# Patient Record
Sex: MALE | URBAN - METROPOLITAN AREA
[De-identification: names, ages, dates, MRNs, and addresses within clinical notes are randomized per-mention and may not be internally consistent; named-entity substitution may affect disease eponyms.]

---

## 2020-04-02 ENCOUNTER — APPOINTMENT (RX ONLY)
Dept: URBAN - METROPOLITAN AREA CLINIC 151 | Facility: CLINIC | Age: 25
Setting detail: DERMATOLOGY
End: 2020-04-02

## 2020-04-02 DIAGNOSIS — B35.2 TINEA MANUUM: ICD-10-CM

## 2020-04-02 DIAGNOSIS — N48.1 BALANITIS: ICD-10-CM

## 2020-04-02 PROCEDURE — 99202 OFFICE O/P NEW SF 15 MIN: CPT | Mod: 95

## 2020-04-02 PROCEDURE — ? PRESCRIPTION

## 2020-04-02 PROCEDURE — ? DIAGNOSIS COMMENT

## 2020-04-02 RX ORDER — TACROLIMUS 1 MG/G
OINTMENT TOPICAL BID
Qty: 1 | Refills: 3 | Status: ERX | COMMUNITY
Start: 2020-04-02

## 2020-04-02 RX ORDER — TRIAMCINOLONE ACETONIDE 1 MG/G
CREAM TOPICAL
Qty: 1 | Refills: 1 | Status: ERX | COMMUNITY
Start: 2020-04-02

## 2020-04-02 RX ADMIN — TACROLIMUS: 1 OINTMENT TOPICAL at 00:00

## 2020-04-02 RX ADMIN — TRIAMCINOLONE ACETONIDE: 1 CREAM TOPICAL at 00:00

## 2020-04-02 ASSESSMENT — LOCATION DETAILED DESCRIPTION DERM
LOCATION DETAILED: RIGHT RADIAL DORSAL HAND
LOCATION DETAILED: LEFT ULNAR DORSAL HAND

## 2020-04-02 ASSESSMENT — LOCATION ZONE DERM: LOCATION ZONE: HAND

## 2020-04-02 ASSESSMENT — LOCATION SIMPLE DESCRIPTION DERM
LOCATION SIMPLE: RIGHT HAND
LOCATION SIMPLE: LEFT HAND

## 2020-04-02 NOTE — HPI: OTHER
Condition:: rash on hands
Please Describe Your Condition:: 1. back in fall complains of fungus between fingers.  Was studying in [place].  Saw Derm in [facility].  Clotrimazole cream/ Hydrocortisone with some improvement.  Wearing non-latex gloves during journey home for about 10 hrs straight.  10 March 2020 \\n\\n +++ Sweat.  \"Blisters\" noted per mom.  Mild itch.  Hands look ashy.  Was localized in the fall.  \\n\\n2.   referred by urologist - Dr. Tenzin Zuleat - concerns on spots on glans penis  x 1.5 years that does not itch.  since near the meatus is moist.  The skin is discolored.  No treatment to date.  Present for years.  After urinating there are a few drops of urine that come out later.  Pt not comfortable sending the image of penis on teledermatology.

## 2020-04-02 NOTE — PROCEDURE: DIAGNOSIS COMMENT
Comment: Will treat empirically with Protopic 0.1%, pt did not want to send photos via TeleDerm. Will follow up for in person visit if needed
Detail Level: Simple
Comment: TAC 0.1% ointment BID for one week, if not better in two weeks f/u with photos
Detail Level: Zone

## 2020-04-02 NOTE — PROCEDURE: MIPS QUALITY
Quality 431: Preventive Care And Screening: Unhealthy Alcohol Use - Screening: Patient screened for unhealthy alcohol use using a single question and scores less than 2 times per year
Quality 130: Documentation Of Current Medications In The Medical Record: Current Medications Documented
Quality 131: Pain Assessment And Follow-Up: Pain assessment using a standardized tool is documented as negative, no follow-up plan required
Detail Level: Detailed
Quality 226: Preventive Care And Screening: Tobacco Use: Screening And Cessation Intervention: Patient screened for tobacco use and is an ex/non-smoker
Quality 110: Preventive Care And Screening: Influenza Immunization: Influenza Immunization previously received during influenza season
Principal Discharge DX:	Syncope